# Patient Record
Sex: FEMALE | Race: WHITE | NOT HISPANIC OR LATINO | URBAN - METROPOLITAN AREA
[De-identification: names, ages, dates, MRNs, and addresses within clinical notes are randomized per-mention and may not be internally consistent; named-entity substitution may affect disease eponyms.]

---

## 2018-12-22 ENCOUNTER — EMERGENCY (EMERGENCY)
Facility: HOSPITAL | Age: 28
LOS: 1 days | End: 2018-12-22
Attending: EMERGENCY MEDICINE | Admitting: EMERGENCY MEDICINE
Payer: COMMERCIAL

## 2018-12-22 PROCEDURE — 99291 CRITICAL CARE FIRST HOUR: CPT

## 2018-12-22 PROCEDURE — 99285 EMERGENCY DEPT VISIT HI MDM: CPT | Mod: 25

## 2018-12-22 PROCEDURE — 82962 GLUCOSE BLOOD TEST: CPT

## 2018-12-22 NOTE — ED ADULT NURSE NOTE - OBJECTIVE STATEMENT
patient comes via EMS CPR in progress pt with combi tube in place IO in left lower leg as per EMS patient was found on floor in her room between bed and wall unresponsive with a needle in her arm per family pt has hx of drug use see Cardiac Arrest Record

## 2018-12-22 NOTE — ED PROVIDER NOTE - PHYSICAL EXAMINATION
GEN: unresponsive, pulseless, mottled   HENT: atraumatic, normocephalic, pupils dilated unresponsive   CV: pulsless, asystolic on monitor   RESP: brenda tube in place   ABD: soft, nontender, nondistended  MUSCULOSKELETAL: atraumtic    SKIN: mottled cool to touch  NEURO: unresponsive

## 2018-12-22 NOTE — ED ADULT TRIAGE NOTE - CHIEF COMPLAINT QUOTE
pt found by family unknown down time, ems arrived found pt Asystole, ACLS started,  combi tube placed,IO LLE,  Narcan 4 mg , Bicarb, 3 Epi

## 2018-12-22 NOTE — ED PROVIDER NOTE - OBJECTIVE STATEMENT
27yo F unknown PMH found down unresponsive w/ needle in her arm as per EMS. unknown total down time, brenda tube placed prior to arrival w/ epis, bicarb, d50 given pta, no reports shockable rhythm.

## 2018-12-22 NOTE — ED PROVIDER NOTE - PROGRESS NOTE DETAILS
intubated using glidescope, given 6mg narcan, epis, bicarb, ca, no change, persistent asystole on monitor   total cardiac standstill on bedside sono. pt pronounced @ 1825hr.